# Patient Record
Sex: MALE | Employment: OTHER
[De-identification: names, ages, dates, MRNs, and addresses within clinical notes are randomized per-mention and may not be internally consistent; named-entity substitution may affect disease eponyms.]

---

## 2024-02-07 ENCOUNTER — HOSPITAL ENCOUNTER (OUTPATIENT)
Facility: HOSPITAL | Age: 89
Discharge: HOME | End: 2024-02-07
Attending: EMERGENCY MEDICINE
Payer: MEDICARE

## 2024-02-07 VITALS
TEMPERATURE: 97.5 F | RESPIRATION RATE: 16 BRPM | SYSTOLIC BLOOD PRESSURE: 130 MMHG | HEART RATE: 81 BPM | DIASTOLIC BLOOD PRESSURE: 73 MMHG

## 2024-02-07 DIAGNOSIS — L97.123: ICD-10-CM

## 2024-02-07 DIAGNOSIS — S80.12XS HEMATOMA OF LEFT LOWER EXTREMITY, SEQUELA: Primary | ICD-10-CM

## 2024-02-07 PROBLEM — S80.12XA HEMATOMA OF LEFT LOWER EXTREMITY: Status: ACTIVE | Noted: 2024-02-07

## 2024-02-07 PROCEDURE — 11043 DBRDMT MUSC&/FSCA 1ST 20/<: CPT

## 2024-02-07 PROCEDURE — 87077 CULTURE AEROBIC IDENTIFY: CPT

## 2024-02-07 PROCEDURE — 87070 CULTURE OTHR SPECIMN AEROBIC: CPT

## 2024-02-07 PROCEDURE — 87205 SMEAR GRAM STAIN: CPT

## 2024-02-07 PROCEDURE — 87186 SC STD MICRODIL/AGAR DIL: CPT

## 2024-02-07 PROCEDURE — 11046 DBRDMT MUSC&/FSCA EA ADDL: CPT

## 2024-02-07 RX ORDER — BACITRACIN ZINC AND POLYMYXIN B SULFATE 500; 1000 [USP'U]/G; [USP'U]/G
OINTMENT TOPICAL ONCE
Status: CANCELLED | OUTPATIENT
Start: 2024-02-07 | End: 2024-02-07

## 2024-02-07 RX ORDER — TRIAMCINOLONE ACETONIDE 1 MG/G
OINTMENT TOPICAL ONCE
Status: CANCELLED | OUTPATIENT
Start: 2024-02-07 | End: 2024-02-07

## 2024-02-07 RX ORDER — IBUPROFEN 200 MG
TABLET ORAL ONCE
OUTPATIENT
Start: 2024-02-07 | End: 2024-02-07

## 2024-02-07 RX ORDER — BETAMETHASONE DIPROPIONATE 0.5 MG/G
CREAM TOPICAL ONCE
Status: CANCELLED | OUTPATIENT
Start: 2024-02-07 | End: 2024-02-07

## 2024-02-07 RX ORDER — LIDOCAINE HYDROCHLORIDE 20 MG/ML
JELLY TOPICAL ONCE
Status: CANCELLED | OUTPATIENT
Start: 2024-02-07 | End: 2024-02-07

## 2024-02-07 RX ORDER — GINSENG 100 MG
CAPSULE ORAL ONCE
OUTPATIENT
Start: 2024-02-07 | End: 2024-02-07

## 2024-02-07 RX ORDER — CLOBETASOL PROPIONATE 0.5 MG/G
OINTMENT TOPICAL ONCE
OUTPATIENT
Start: 2024-02-07 | End: 2024-02-07

## 2024-02-07 RX ORDER — LIDOCAINE HYDROCHLORIDE 20 MG/ML
JELLY TOPICAL ONCE
OUTPATIENT
Start: 2024-02-07 | End: 2024-02-07

## 2024-02-07 RX ORDER — GENTAMICIN SULFATE 1 MG/G
OINTMENT TOPICAL ONCE
OUTPATIENT
Start: 2024-02-07 | End: 2024-02-07

## 2024-02-07 RX ORDER — GENTAMICIN SULFATE 1 MG/G
OINTMENT TOPICAL ONCE
Status: CANCELLED | OUTPATIENT
Start: 2024-02-07 | End: 2024-02-07

## 2024-02-07 RX ORDER — CLOBETASOL PROPIONATE 0.5 MG/G
OINTMENT TOPICAL ONCE
Status: CANCELLED | OUTPATIENT
Start: 2024-02-07 | End: 2024-02-07

## 2024-02-07 RX ORDER — LIDOCAINE HYDROCHLORIDE 40 MG/ML
SOLUTION TOPICAL ONCE
OUTPATIENT
Start: 2024-02-07 | End: 2024-02-07

## 2024-02-07 RX ORDER — LIDOCAINE 50 MG/G
OINTMENT TOPICAL ONCE
OUTPATIENT
Start: 2024-02-07 | End: 2024-02-07

## 2024-02-07 RX ORDER — SODIUM CHLOR/HYPOCHLOROUS ACID 0.033 %
SOLUTION, IRRIGATION IRRIGATION ONCE
OUTPATIENT
Start: 2024-02-07 | End: 2024-02-07

## 2024-02-07 RX ORDER — BACITRACIN ZINC AND POLYMYXIN B SULFATE 500; 1000 [USP'U]/G; [USP'U]/G
OINTMENT TOPICAL ONCE
OUTPATIENT
Start: 2024-02-07 | End: 2024-02-07

## 2024-02-07 RX ORDER — LIDOCAINE 50 MG/G
OINTMENT TOPICAL ONCE
Status: CANCELLED | OUTPATIENT
Start: 2024-02-07 | End: 2024-02-07

## 2024-02-07 RX ORDER — SODIUM CHLOR/HYPOCHLOROUS ACID 0.033 %
SOLUTION, IRRIGATION IRRIGATION ONCE
Status: CANCELLED | OUTPATIENT
Start: 2024-02-07 | End: 2024-02-07

## 2024-02-07 RX ORDER — LIDOCAINE 40 MG/G
CREAM TOPICAL ONCE
Status: CANCELLED | OUTPATIENT
Start: 2024-02-07 | End: 2024-02-07

## 2024-02-07 RX ORDER — IBUPROFEN 200 MG
TABLET ORAL ONCE
Status: CANCELLED | OUTPATIENT
Start: 2024-02-07 | End: 2024-02-07

## 2024-02-07 RX ORDER — LIDOCAINE 40 MG/G
CREAM TOPICAL ONCE
OUTPATIENT
Start: 2024-02-07 | End: 2024-02-07

## 2024-02-07 RX ORDER — LIDOCAINE HYDROCHLORIDE 40 MG/ML
SOLUTION TOPICAL ONCE
Status: CANCELLED | OUTPATIENT
Start: 2024-02-07 | End: 2024-02-07

## 2024-02-07 RX ORDER — GINSENG 100 MG
CAPSULE ORAL ONCE
Status: CANCELLED | OUTPATIENT
Start: 2024-02-07 | End: 2024-02-07

## 2024-02-07 RX ORDER — TRIAMCINOLONE ACETONIDE 1 MG/G
OINTMENT TOPICAL ONCE
OUTPATIENT
Start: 2024-02-07 | End: 2024-02-07

## 2024-02-07 RX ORDER — BETAMETHASONE DIPROPIONATE 0.5 MG/G
CREAM TOPICAL ONCE
OUTPATIENT
Start: 2024-02-07 | End: 2024-02-07

## 2024-02-07 NOTE — PLAN OF CARE
Home Health Referral for skilled Nursing wound care       Supply Company:     [facility]     Ordering Center:     [facility]  [address] [facility] [address] [phone]  WOUND CARE [phone]  FAX NUMBER [phone]    Patient Information:      Veronica Darling  [address]  [address] [facility] [address] [phone]   : 1934  AGE: 89 y.o.     GENDER: male   TODAYS DATE:  2024    Insurance:      PRIMARY INSURANCE:  [id number] - (Medicare)    Payer/Plan Subscr  Sex Relation Sub. Ins. ID Effective Group Num   1. MEDICARE - ME* VERONICA DARLING 1934 Male Self [id number]                                    PO BOX        Patient Wound Information:      Problem List Items Addressed This Visit          Other    Hematoma of left lower extremity - Primary    Ulcer of left thigh, with necrosis of muscle (HCC)    Relevant Orders    Initiate Outpatient Wound Care Protocol    Culture, Wound     Wound Care Documentation:  Wound 24 Thigh Left;Medial #1 (Active)   Wound Image   24 1408   Wound Etiology Traumatic 24 1408   Wound Cleansed Vashe 24 1408   Dressing/Treatment Alginate with Ag;Gauze dressing/dressing sponge;ABD;Roll gauze;Tape change 24 1428   Wound Length (cm) 6.6 cm 24 1323   Wound Width (cm) 4.5 cm 24 1323   Wound Depth (cm) 4 cm 24 1323   Wound Surface Area (cm^2) 29.7 cm^2 24 1323   Wound Volume (cm^3) 118.8 cm^3 24 1323   Post-Procedure Length (cm) 6.6 cm 24 1408   Post-Procedure Width (cm) 4.5 cm 24 1408   Post-Procedure Depth (cm) 5.3 cm 24 1408   Post-Procedure Surface Area (cm^2) 29.7 cm^2 24 1408   Post-Procedure Volume (cm^3) 157.41 cm^3 24 1408   Undermining Starts ___ O'Clock 2 24 1408   Undermining Ends___ O'Clock 4 24 1408   Undermining Maxium Distance (cm) 6 24 1408   Wound Assessment Eschar

## 2024-02-07 NOTE — PROGRESS NOTES
Patient Name:Rigo Butcher  : 1934  MRN: [id number]        Past Medical History:   Diagnosis Date    Atrial fibrillation (HCC)     Elevated PSA     Hypertension        Past Surgical History:   Procedure Laterality Date    TONSILLECTOMY         History reviewed. No pertinent family history.    Social History     Tobacco Use    Smoking status: Former     Current packs/day: 0.00     Types: Cigarettes     Quit date: 1964     Years since quittin.1    Smokeless tobacco: Never   Substance Use Topics    Alcohol use: Yes     Alcohol/week: 8.0 standard drinks of alcohol     Types: 7 Cans of beer, 1 Shots of liquor per week    Drug use: Never       No Known Allergies    Current Outpatient Medications on File Prior to Encounter   Medication Sig Dispense Refill    VITAMIN A PO Take by mouth      SELENIUM PO Take by mouth      ascorbic acid (VITAMIN C) 500 MG tablet Take 1 tablet by mouth      vitamin D 25 MCG (1000 UT) CAPS Take 1 tablet by mouth      Cyanocobalamin 1000 MCG LOZG Take 1 tablet by mouth      hydroCHLOROthiazide (HYDRODIURIL) 25 MG tablet TAKE 1 TABLET BY MOUTH ONCE EVERY DAY      metoprolol succinate (TOPROL XL) 50 MG extended release tablet metoprolol succinate ER 50 mg tablet,extended release 24 hr      ramipril (ALTACE) 10 MG capsule Take 1 capsule by mouth daily      rivaroxaban (XARELTO) 20 MG TABS tablet TAKE 1 TABLET BY MOUTH ONCE EVERY DAY      tamsulosin (FLOMAX) 0.4 MG capsule Take 1 capsule by mouth      triamcinolone (KENALOG) 0.025 % cream Place onto the skin 2 times daily      vitamin E 400 UNIT capsule Take 1 tablet by mouth      zinc gluconate 50 MG tablet Take 1 tablet by mouth       No current facility-administered medications on file prior to encounter.       No results found for: \"LABA1C\"    Wound 24 Thigh Left;Medial #1 (Active)   Wound Image   24 1408   Wound Etiology Traumatic 24 1408   Wound Cleansed Vashe 24 1408   Dressing/Treatment Alginate

## 2024-02-07 NOTE — FLOWSHEET NOTE
02/07/24 1323   Anesthetic   Anesthetic 4% Lidocaine Liquid Topical   Wound 02/07/24 Thigh Left;Medial #1   Date First Assessed/Time First Assessed: 02/07/24 1322   Present on Original Admission: Yes  Wound Approximate Age at First Assessment (Weeks): 8 weeks  Location: Thigh  Wound Location Orientation: Left;Medial  Wound Description (Comments): #1   Wound Image    Wound Etiology Other  (Hematoma)   Wound Length (cm) 6.6 cm   Wound Width (cm) 4.5 cm   Wound Depth (cm) 4 cm   Wound Surface Area (cm^2) 29.7 cm^2   Wound Volume (cm^3) 118.8 cm^3   Undermining Starts ___ O'Clock 12   Undermining Ends___ O'Clock 4   Undermining Maxium Distance (cm) 2.3   Wound Assessment Eschar dry;Subcutaneous;Slough;Pink/red;Granulation tissue  (blood clots)   Drainage Amount Moderate (25-50%)   Drainage Description Serosanguinous   Odor None   Ghazal-wound Assessment Blanchable erythema;Induration   Margins Flat/open edges   Wound Thickness Description not for Pressure Injury Full thickness     /73   Pulse 81   Temp 97.5 °F (36.4 °C) (Temporal)   Resp 16

## 2024-02-07 NOTE — FLOWSHEET NOTE
02/07/24 1428   Left Leg Edema Point of Measurement   Compression Therapy Tubular elastic support bandage   Wound 02/07/24 Thigh Left;Medial #1   Date First Assessed/Time First Assessed: 02/07/24 1322   Present on Original Admission: Yes  Wound Approximate Age at First Assessment (Weeks): 8 weeks  Location: Thigh  Wound Location Orientation: Left;Medial  Wound Description (Comments): #1   Dressing/Treatment Alginate with Ag;Gauze dressing/dressing sponge;ABD;Roll gauze;Tape change     Discharge Condition: Stable    Pain: 0    Ambulatory Status:Walking and Straight Cane    Discharge Destination: Home    Transportation:Car    Accompanied by: Self and Family    Discharge instructions reviewed with Self and Family and copy or written instructions have been provided. All questions/concerns have been addressed at this time.

## 2024-02-07 NOTE — PATIENT INSTRUCTIONS
Discharge Instructions for  [facility]  [address] [place] [address]  Telephone: [phone]   FAX [phone]    NAME:  Rigo Butcher  YOB: 1934  ACCOUNT NUMBER :  [id number]  DATE:  2/7/2024       : FLAVIO MACK RN     HOME HEALTH: [facility]     Wound Cleansing:   Do not scrub or use excessive force.  Cleanse wound prior to applying a clean dressing with:      [x] Cleanse wound with: VASHE irrigate wound     [x] May Shower at Discharge     [x] Shower on days of dressing change, remove dressing first and do Vashe cleanse after shower.     [] Keep Wound Dry in Shower (may purchase a cast cover if needed)       Dressings:           Wound Location Left thigh wound    Apply Primary Dressing:       [] MediHoney Gel    [] MediHoney Alginate  [] Alginate     [x] Alginate with Silver       [] Collagen   [] Collagen with Silver     [] Hydrocolloid  [] Hydrofera Blue Classic (moisten with saline)  [] Hydrofera Blue Ready  [] Other:    [] Pack wound loosely with      Cover and Secure with:    [x] Gauze   [] Vikram   [x] Kerlix  [] Ace Wrap     [] ABD  [x] Medipore tape  [] tape  [x] Other: Medial side of wound, fold 4x4 gauze and place on skin to assist with compressing area of wound with free space.    Avoid contact of tape with skin.    Change dressing:   [x] Daily for next three days then may switch to three times a week.    [] Every Other Day   [] Three times per week  [] Once a week   [] Do Not Change Dressing     [] Other:    Edema Control:   Apply every morning immediately when getting up should be applied to affected leg(s) from mid foot to knee making sure to cover the heel.  Remove every night before going to bed.    Apply:      [x] Tubigrip: Left Leg Double Layer to lower leg and upper thigh          [x] Elevate leg(s)  when sitting.      [x] Avoid prolonged standing in one place.    Return Appointment:    [x] Return Appointment: With

## 2024-02-08 ENCOUNTER — HOSPITAL ENCOUNTER (OUTPATIENT)
Facility: HOSPITAL | Age: 89
Discharge: HOME | End: 2024-02-08
Attending: EMERGENCY MEDICINE
Payer: MEDICARE

## 2024-02-08 VITALS — RESPIRATION RATE: 17 BRPM | TEMPERATURE: 98.1 F

## 2024-02-08 PROCEDURE — 99213 OFFICE O/P EST LOW 20 MIN: CPT

## 2024-02-08 NOTE — FLOWSHEET NOTE
02/08/24 1214   Left Leg Edema Point of Measurement   Compression Therapy Tubular elastic support bandage   Wound 02/07/24 Thigh Left;Medial #1   Date First Assessed/Time First Assessed: 02/07/24 1322   Present on Original Admission: Yes  Wound Approximate Age at First Assessment (Weeks): 8 weeks  Location: Thigh  Wound Location Orientation: Left;Medial  Wound Description (Comments): #1   Wound Cleansed Vashe  (irrigation)   Dressing/Treatment Alginate with Ag;Gauze dressing/dressing sponge;Roll gauze;Tape/Soft cloth adhesive tape  (drawtex)     Discharge Condition: Stable    Pain: 0    Ambulatory Status: None    Discharge Destination: home    Transportation:car    Accompanied by: FAMILY    Discharge instructions reviewed with SELF and copy or written instructions have been provided. All questions/concerns have been addressed at this time.

## 2024-02-10 LAB
BACTERIA SPEC CULT: ABNORMAL
GRAM STN SPEC: ABNORMAL
SERVICE CMNT-IMP: ABNORMAL

## 2024-02-21 ENCOUNTER — HOSPITAL ENCOUNTER (OUTPATIENT)
Facility: HOSPITAL | Age: 89
Discharge: HOME | End: 2024-02-21
Attending: EMERGENCY MEDICINE
Payer: MEDICARE

## 2024-02-21 VITALS
RESPIRATION RATE: 18 BRPM | HEART RATE: 93 BPM | SYSTOLIC BLOOD PRESSURE: 165 MMHG | TEMPERATURE: 97.8 F | DIASTOLIC BLOOD PRESSURE: 70 MMHG

## 2024-02-21 DIAGNOSIS — L97.123: Primary | ICD-10-CM

## 2024-02-21 PROCEDURE — 11043 DBRDMT MUSC&/FSCA 1ST 20/<: CPT

## 2024-02-21 RX ORDER — LIDOCAINE HYDROCHLORIDE 20 MG/ML
JELLY TOPICAL ONCE
OUTPATIENT
Start: 2024-02-21 | End: 2024-02-21

## 2024-02-21 RX ORDER — LIDOCAINE 40 MG/G
CREAM TOPICAL ONCE
OUTPATIENT
Start: 2024-02-21 | End: 2024-02-21

## 2024-02-21 RX ORDER — BACITRACIN ZINC AND POLYMYXIN B SULFATE 500; 1000 [USP'U]/G; [USP'U]/G
OINTMENT TOPICAL ONCE
OUTPATIENT
Start: 2024-02-21 | End: 2024-02-21

## 2024-02-21 RX ORDER — IBUPROFEN 200 MG
TABLET ORAL ONCE
OUTPATIENT
Start: 2024-02-21 | End: 2024-02-21

## 2024-02-21 RX ORDER — GENTAMICIN SULFATE 1 MG/G
OINTMENT TOPICAL ONCE
OUTPATIENT
Start: 2024-02-21 | End: 2024-02-21

## 2024-02-21 RX ORDER — LIDOCAINE 50 MG/G
OINTMENT TOPICAL ONCE
OUTPATIENT
Start: 2024-02-21 | End: 2024-02-21

## 2024-02-21 RX ORDER — CLOBETASOL PROPIONATE 0.5 MG/G
OINTMENT TOPICAL ONCE
OUTPATIENT
Start: 2024-02-21 | End: 2024-02-21

## 2024-02-21 RX ORDER — TRIAMCINOLONE ACETONIDE 1 MG/G
OINTMENT TOPICAL ONCE
OUTPATIENT
Start: 2024-02-21 | End: 2024-02-21

## 2024-02-21 RX ORDER — GINSENG 100 MG
CAPSULE ORAL ONCE
OUTPATIENT
Start: 2024-02-21 | End: 2024-02-21

## 2024-02-21 RX ORDER — SODIUM CHLOR/HYPOCHLOROUS ACID 0.033 %
SOLUTION, IRRIGATION IRRIGATION ONCE
OUTPATIENT
Start: 2024-02-21 | End: 2024-02-21

## 2024-02-21 RX ORDER — LIDOCAINE HYDROCHLORIDE 40 MG/ML
SOLUTION TOPICAL ONCE
OUTPATIENT
Start: 2024-02-21 | End: 2024-02-21

## 2024-02-21 RX ORDER — BETAMETHASONE DIPROPIONATE 0.5 MG/G
CREAM TOPICAL ONCE
OUTPATIENT
Start: 2024-02-21 | End: 2024-02-21

## 2024-02-21 NOTE — PATIENT INSTRUCTIONS
Discharge Instructions for  [facility]  [address] [place] [address]  Telephone: [phone]   FAX [phone]    NAME:  Rigo Butcher  YOB: 1934  ACCOUNT NUMBER :  [id number]  DATE:  2/21/2024       : FLAVIO MACK RN     HOME HEALTH: [facility]     Wound Cleansing:   Do not scrub or use excessive force.  Cleanse wound prior to applying a clean dressing with:      [x] Cleanse wound with: VASHE irrigate wound     [x] May Shower at Discharge     [x] Shower on days of dressing change, remove dressing first and do Vashe cleanse after shower.     [] Keep Wound Dry in Shower (may purchase a cast cover if needed)       Dressings:           Wound Location Left thigh wound    Apply Primary Dressing:       [] MediHoney Gel    [] MediHoney Alginate  [] Alginate     [x] Tanna to wound base then cover with Alginate with Silver       [] Collagen   [] Collagen with Silver     [] Hydrocolloid  [] Hydrofera Blue Classic (moisten with saline)  [] Hydrofera Blue Ready  [] Other:    [] Pack wound loosely with      Cover and Secure with:    [] Gauze   [] Vikram   [] Kerlix  [] Ace Wrap     [x] ABD  [] Medipore tape  [] tape  [] Other:    Avoid contact of tape with skin.    Change dressing:   [] Daily    [] Every Other Day   [x] Twice times per week  [] Once a week   [] Do Not Change Dressing     [] Other:    Edema Control:     Apply:      [x] Two layer calamine standard compression wrap, continue thigh high. (Area of knee cover with ABD pad posterior and medial do not tape)           [x] Elevate leg(s)  when sitting.      [x] Avoid prolonged standing in one place.    Return Appointment:    [x] Return Appointment: With Dr. Gio Guadalupe  in  2 Week(s)          Wound Care Center Information: Should you experience any significant changes in your wound(s) or have questions about your wound care, please contact the [facility] at

## 2024-02-21 NOTE — FLOWSHEET NOTE
02/21/24 1322   Anesthetic   Anesthetic 4% Lidocaine Liquid Topical   Wound 02/07/24 Thigh Left;Medial #1   Date First Assessed/Time First Assessed: 02/07/24 1322   Present on Original Admission: Yes  Wound Approximate Age at First Assessment (Weeks): 8 weeks  Location: Thigh  Wound Location Orientation: Left;Medial  Wound Description (Comments): #1   Wound Image    Wound Cleansed Cleansed with saline   Wound Length (cm) 4 cm   Wound Width (cm) 1.5 cm   Wound Depth (cm) 0.6 cm   Wound Surface Area (cm^2) 6 cm^2   Change in Wound Size % (l*w) 79.8   Wound Volume (cm^3) 3.6 cm^3   Wound Healing % 97   Undermining Starts ___ O'Clock 2   Undermining Ends___ O'Clock 3   Undermining Maxium Distance (cm) 1.5   Wound Assessment [place]/red;Slough   Drainage Amount Moderate (25-50%)   Drainage Description Serosanguinous   Odor None   Ghazal-wound Assessment Hyperpigmented   Margins Epibole (rolled edges)   Wound Thickness Description not for Pressure Injury Full thickness     BP (!) 165/70   Pulse 93   Temp 97.8 °F (36.6 °C) (Temporal)   Resp 18

## 2024-02-21 NOTE — PROGRESS NOTES
[facility]   Progress Note and Procedure Note   NO Procedure    Patient Name: Rigo Butcher  : 1934  MRN: [id number]    Past Medical History:   Diagnosis Date    Atrial fibrillation (HCC)     Elevated PSA     Hypertension      Past Surgical History:   Procedure Laterality Date    TONSILLECTOMY       No family history on file.  Social History     Tobacco Use    Smoking status: Former     Current packs/day: 0.00     Types: Cigarettes     Quit date: 1964     Years since quittin.1    Smokeless tobacco: Never   Substance Use Topics    Alcohol use: Yes     Alcohol/week: 8.0 standard drinks of alcohol     Types: 7 Cans of beer, 1 Shots of liquor per week    Drug use: Never     No Known Allergies  Current Outpatient Medications on File Prior to Encounter   Medication Sig Dispense Refill    VITAMIN A PO Take by mouth      SELENIUM PO Take by mouth      ascorbic acid (VITAMIN C) 500 MG tablet Take 1 tablet by mouth      vitamin D 25 MCG (1000 UT) CAPS Take 1 tablet by mouth      Cyanocobalamin 1000 MCG LOZG Take 1 tablet by mouth      hydroCHLOROthiazide (HYDRODIURIL) 25 MG tablet TAKE 1 TABLET BY MOUTH ONCE EVERY DAY      metoprolol succinate (TOPROL XL) 50 MG extended release tablet metoprolol succinate ER 50 mg tablet,extended release 24 hr      ramipril (ALTACE) 10 MG capsule Take 1 capsule by mouth daily      rivaroxaban (XARELTO) 20 MG TABS tablet TAKE 1 TABLET BY MOUTH ONCE EVERY DAY      tamsulosin (FLOMAX) 0.4 MG capsule Take 1 capsule by mouth      triamcinolone (KENALOG) 0.025 % cream Place onto the skin 2 times daily      vitamin E 400 UNIT capsule Take 1 tablet by mouth      zinc gluconate 50 MG tablet Take 1 tablet by mouth       No current facility-administered medications on file prior to encounter.     No results found for: \"LABA1C\"      Vitals:    24 1319   BP: (!) 165/70   Pulse: 93   Resp: 18   Temp: 97.8 °F (36.6 °C)      Wound 24 Thigh Left;Medial

## 2024-02-21 NOTE — FLOWSHEET NOTE
02/21/24 1351   Left Leg Edema Point of Measurement   Compression Therapy 2 layer compression wrap;Other  (thigh high)   Wound 02/07/24 Thigh Left;Medial #1   Date First Assessed/Time First Assessed: 02/07/24 1322   Present on Original Admission: Yes  Wound Approximate Age at First Assessment (Weeks): 8 weeks  Location: Thigh  Wound Location Orientation: Left;Medial  Wound Description (Comments): #1   Dressing Status New dressing applied   Dressing/Treatment Collagen with Ag;Alginate with Ag;ABD;Other (comment)  (cover posterior and medial knee w/ ABD)     Discharge Condition: Stable    Pain: 0    Ambulatory Status:Walking    Discharge Destination: Home    Transportation:Car    Accompanied by: Self and Family    Discharge instructions reviewed with Self and Family and copy or written instructions have been provided. All questions/concerns have been addressed at this time.     Multilayer Compression Wrap   (Not Unna) Below the Knee    NAME:  Rigo Butcher  YOB: 1934  MEDICAL RECORD NUMBER:  [id number]  DATE:  2/21/2024    Multilayer compression wrap: Removed old Multilayer wrap if indicated and wash leg with mild soap/water.  Applied moisturizing agent to dry skin as needed.   Applied primary and secondary dressing as ordered.  Applied multilayered dressing below the knee to left lower leg.  Instructed patient/caregiver not to remove dressing and to keep it clean and dry.   Instructed patient/caregiver on complications to report to provider, such as pain, numbness in toes, heavy drainage, and slippage of dressing.  Instructed patient on purpose of compression dressing and on activity and exercise recommendations.      Electronically signed by Ketty Armenta RN on 2/21/2024 at 1:53 PM

## 2024-03-06 ENCOUNTER — HOSPITAL ENCOUNTER (OUTPATIENT)
Facility: HOSPITAL | Age: 89
Discharge: HOME | End: 2024-03-06
Attending: EMERGENCY MEDICINE
Payer: MEDICARE

## 2024-03-06 VITALS
DIASTOLIC BLOOD PRESSURE: 85 MMHG | SYSTOLIC BLOOD PRESSURE: 134 MMHG | TEMPERATURE: 97.7 F | HEART RATE: 95 BPM | RESPIRATION RATE: 18 BRPM

## 2024-03-06 DIAGNOSIS — L97.123: Primary | ICD-10-CM

## 2024-03-06 PROCEDURE — 11042 DBRDMT SUBQ TIS 1ST 20SQCM/<: CPT

## 2024-03-06 RX ORDER — PREDNISONE 20 MG/1
1 TABLET ORAL DAILY
COMMUNITY

## 2024-03-06 RX ORDER — LIDOCAINE 50 MG/G
OINTMENT TOPICAL ONCE
OUTPATIENT
Start: 2024-03-06 | End: 2024-03-06

## 2024-03-06 RX ORDER — LIDOCAINE HYDROCHLORIDE 20 MG/ML
JELLY TOPICAL ONCE
OUTPATIENT
Start: 2024-03-06 | End: 2024-03-06

## 2024-03-06 RX ORDER — LIDOCAINE 40 MG/G
CREAM TOPICAL ONCE
OUTPATIENT
Start: 2024-03-06 | End: 2024-03-06

## 2024-03-06 RX ORDER — CLOBETASOL PROPIONATE 0.5 MG/G
OINTMENT TOPICAL ONCE
OUTPATIENT
Start: 2024-03-06 | End: 2024-03-06

## 2024-03-06 RX ORDER — SODIUM CHLOR/HYPOCHLOROUS ACID 0.033 %
SOLUTION, IRRIGATION IRRIGATION ONCE
OUTPATIENT
Start: 2024-03-06 | End: 2024-03-06

## 2024-03-06 RX ORDER — BETAMETHASONE DIPROPIONATE 0.5 MG/G
CREAM TOPICAL ONCE
OUTPATIENT
Start: 2024-03-06 | End: 2024-03-06

## 2024-03-06 RX ORDER — TRIAMCINOLONE ACETONIDE 1 MG/G
OINTMENT TOPICAL ONCE
OUTPATIENT
Start: 2024-03-06 | End: 2024-03-06

## 2024-03-06 RX ORDER — GENTAMICIN SULFATE 1 MG/G
OINTMENT TOPICAL ONCE
OUTPATIENT
Start: 2024-03-06 | End: 2024-03-06

## 2024-03-06 RX ORDER — LIDOCAINE HYDROCHLORIDE 40 MG/ML
SOLUTION TOPICAL ONCE
OUTPATIENT
Start: 2024-03-06 | End: 2024-03-06

## 2024-03-06 RX ORDER — IBUPROFEN 200 MG
TABLET ORAL ONCE
OUTPATIENT
Start: 2024-03-06 | End: 2024-03-06

## 2024-03-06 RX ORDER — GINSENG 100 MG
CAPSULE ORAL ONCE
OUTPATIENT
Start: 2024-03-06 | End: 2024-03-06

## 2024-03-06 RX ORDER — BACITRACIN ZINC AND POLYMYXIN B SULFATE 500; 1000 [USP'U]/G; [USP'U]/G
OINTMENT TOPICAL ONCE
OUTPATIENT
Start: 2024-03-06 | End: 2024-03-06

## 2024-03-06 NOTE — PATIENT INSTRUCTIONS
Discharge Instructions for  [facility]  [address] [place] [address]  Telephone: [phone]   FAX [phone]    NAME:  Rigo Butcher  YOB: 1934  ACCOUNT NUMBER :  [id number]  DATE:  3/6/2024       : FLAVIO MACK RN     HOME HEALTH: [facility] may discharge after one week from service.     Wound Cleansing:   Do not scrub or use excessive force.  Cleanse wound prior to applying a clean dressing with:      [x] Cleanse wound with: VASHE irrigate wound     [x] May Shower at Discharge     [x] Shower on days of dressing change, remove dressing first and do Vashe cleanse after shower.     [] Keep Wound Dry in Shower (may purchase a cast cover if needed)       Dressings:           Wound Location Left thigh wound    Apply Primary Dressing:       [] MediHoney Gel    [] MediHoney Alginate  [] Alginate     [x] Alginate with Silver       [] Collagen   [] Collagen with Silver     [] Hydrocolloid  [] Hydrofera Blue Classic (moisten with saline)  [] Hydrofera Blue Ready  [] Other:    [] Pack wound loosely with      Cover and Secure with:    [] Gauze   [] Vikram   [] Kerlix  [] Ace Wrap     [] ABD  [] Medipore tape  [] tape  [x] Other: Foam with silicone boarder    Avoid contact of tape with skin.    Change dressing:   [] Daily    [x] Every Other Day   [] Twice times per week  [] Once a week   [] Do Not Change Dressing     [x] Other: Single tubi size F to knee, Knee to groin size G single tubi. (May start using own compression once purchased)             [x] Elevate leg(s)  when sitting.      [x] Avoid prolonged standing in one place.    Return Appointment:    [x] Return Appointment: With Dr. Gio Guadalupe  in  3 Week(s)          Wound Care Center Information: Should you experience any significant changes in your wound(s) or have questions about your wound care, please contact the [facility] at MONDAY-THURSDAY 8:00 am - 4:30 AND

## 2024-03-06 NOTE — PROGRESS NOTES
[facility]   Progress Note and Procedure Note   NO Procedure    Patient Name: Rigo Butcher  : 1934  MRN: [id number]    Past Medical History:   Diagnosis Date    Atrial fibrillation (HCC)     Elevated PSA     Hypertension      Past Surgical History:   Procedure Laterality Date    TONSILLECTOMY       No family history on file.  Social History     Tobacco Use    Smoking status: Former     Current packs/day: 0.00     Types: Cigarettes     Quit date: 1964     Years since quittin.2    Smokeless tobacco: Never   Substance Use Topics    Alcohol use: Yes     Alcohol/week: 8.0 standard drinks of alcohol     Types: 7 Cans of beer, 1 Shots of liquor per week    Drug use: Never     No Known Allergies  Current Outpatient Medications on File Prior to Encounter   Medication Sig Dispense Refill    predniSONE (DELTASONE) 20 MG tablet Take 1 tablet by mouth daily      VITAMIN A PO Take by mouth      SELENIUM PO Take by mouth      ascorbic acid (VITAMIN C) 500 MG tablet Take 1 tablet by mouth      vitamin D 25 MCG (1000 UT) CAPS Take 1 tablet by mouth      Cyanocobalamin 1000 MCG LOZG Take 1 tablet by mouth      hydroCHLOROthiazide (HYDRODIURIL) 25 MG tablet TAKE 1 TABLET BY MOUTH ONCE EVERY DAY      metoprolol succinate (TOPROL XL) 50 MG extended release tablet metoprolol succinate ER 50 mg tablet,extended release 24 hr      ramipril (ALTACE) 10 MG capsule Take 1 capsule by mouth daily      rivaroxaban (XARELTO) 20 MG TABS tablet TAKE 1 TABLET BY MOUTH ONCE EVERY DAY (Patient not taking: Reported on 3/6/2024)      tamsulosin (FLOMAX) 0.4 MG capsule Take 1 capsule by mouth      triamcinolone (KENALOG) 0.025 % cream Place onto the skin 2 times daily      vitamin E 400 UNIT capsule Take 1 tablet by mouth      zinc gluconate 50 MG tablet Take 1 tablet by mouth       No current facility-administered medications on file prior to encounter.     No results found for: \"LABA1C\"      Vitals:

## 2024-03-06 NOTE — FLOWSHEET NOTE
03/06/24 1318   Anesthetic   Anesthetic 4% Lidocaine Liquid Topical   Left Leg Edema Point of Measurement   Leg circumference 33.2 cm   Ankle circumference 23 cm   LLE Neurovascular Assessment   Capillary Refill Less than/Equal to 3 seconds   Color Appropriate for Ethnicity   Temperature Cold   L Pedal Pulse +2   Wound 02/07/24 Thigh Left;Medial #1   Date First Assessed/Time First Assessed: 02/07/24 1322   Present on Original Admission: Yes  Wound Approximate Age at First Assessment (Weeks): 8 weeks  Location: Thigh  Wound Location Orientation: Left;Medial  Wound Description (Comments): #1   Wound Image    Dressing Status Old drainage noted   Wound Cleansed Cleansed with saline   Wound Length (cm) 3.5 cm   Wound Width (cm) 1.3 cm   Wound Depth (cm) 0.4 cm   Wound Surface Area (cm^2) 4.55 cm^2   Change in Wound Size % (l*w) 84.68   Wound Volume (cm^3) 1.82 cm^3   Wound Healing % 98   Wound Assessment [place]/red;Slough   Drainage Amount Moderate (25-50%)   Drainage Description Serosanguinous   Odor None   Ghazal-wound Assessment Hyperpigmented;Blanchable erythema   Margins Epibole (rolled edges)     /85   Pulse 95   Temp 97.7 °F (36.5 °C) (Temporal)   Resp 18

## 2024-03-06 NOTE — FLOWSHEET NOTE
03/06/24 1353   Left Leg Edema Point of Measurement   Compression Therapy Tubular elastic support bandage;Other  (single F up to the knee; single G from knee to groin)   Tubular Elastic Support Bandage Compression Pressure Low   Wound 02/07/24 Thigh Left;Medial #1   Date First Assessed/Time First Assessed: 02/07/24 1322   Present on Original Admission: Yes  Wound Approximate Age at First Assessment (Weeks): 8 weeks  Location: Thigh  Wound Location Orientation: Left;Medial  Wound Description (Comments): #1   Dressing Status New dressing applied   Wound Cleansed Vashe   Dressing/Treatment Alginate with Ag;Silicone border     Discharge Condition: Stable    Pain: 0    Ambulatory Status:Walking    Discharge Destination: Home    Transportation:Car    Accompanied by: Self and Family    Discharge instructions reviewed with Self and Family and copy or written instructions have been provided. All questions/concerns have been addressed at this time.

## 2024-03-27 ENCOUNTER — HOSPITAL ENCOUNTER (OUTPATIENT)
Facility: HOSPITAL | Age: 89
Discharge: HOME | End: 2024-03-27
Attending: EMERGENCY MEDICINE
Payer: MEDICARE

## 2024-03-27 VITALS
DIASTOLIC BLOOD PRESSURE: 70 MMHG | TEMPERATURE: 97.6 F | HEART RATE: 94 BPM | SYSTOLIC BLOOD PRESSURE: 135 MMHG | RESPIRATION RATE: 16 BRPM

## 2024-03-27 DIAGNOSIS — L97.123: Primary | ICD-10-CM

## 2024-03-27 PROCEDURE — 11042 DBRDMT SUBQ TIS 1ST 20SQCM/<: CPT

## 2024-03-27 RX ORDER — TRIAMCINOLONE ACETONIDE 1 MG/G
OINTMENT TOPICAL ONCE
OUTPATIENT
Start: 2024-03-27 | End: 2024-03-27

## 2024-03-27 RX ORDER — GINSENG 100 MG
CAPSULE ORAL ONCE
OUTPATIENT
Start: 2024-03-27 | End: 2024-03-27

## 2024-03-27 RX ORDER — BETAMETHASONE DIPROPIONATE 0.5 MG/G
CREAM TOPICAL ONCE
OUTPATIENT
Start: 2024-03-27 | End: 2024-03-27

## 2024-03-27 RX ORDER — GENTAMICIN SULFATE 1 MG/G
OINTMENT TOPICAL ONCE
OUTPATIENT
Start: 2024-03-27 | End: 2024-03-27

## 2024-03-27 RX ORDER — SODIUM CHLOR/HYPOCHLOROUS ACID 0.033 %
SOLUTION, IRRIGATION IRRIGATION ONCE
OUTPATIENT
Start: 2024-03-27 | End: 2024-03-27

## 2024-03-27 RX ORDER — LIDOCAINE HYDROCHLORIDE 40 MG/ML
SOLUTION TOPICAL ONCE
OUTPATIENT
Start: 2024-03-27 | End: 2024-03-27

## 2024-03-27 RX ORDER — LIDOCAINE HYDROCHLORIDE 20 MG/ML
JELLY TOPICAL ONCE
OUTPATIENT
Start: 2024-03-27 | End: 2024-03-27

## 2024-03-27 RX ORDER — LIDOCAINE 40 MG/G
CREAM TOPICAL ONCE
OUTPATIENT
Start: 2024-03-27 | End: 2024-03-27

## 2024-03-27 RX ORDER — LIDOCAINE 50 MG/G
OINTMENT TOPICAL ONCE
OUTPATIENT
Start: 2024-03-27 | End: 2024-03-27

## 2024-03-27 RX ORDER — BACITRACIN ZINC AND POLYMYXIN B SULFATE 500; 1000 [USP'U]/G; [USP'U]/G
OINTMENT TOPICAL ONCE
OUTPATIENT
Start: 2024-03-27 | End: 2024-03-27

## 2024-03-27 RX ORDER — IBUPROFEN 200 MG
TABLET ORAL ONCE
OUTPATIENT
Start: 2024-03-27 | End: 2024-03-27

## 2024-03-27 RX ORDER — CLOBETASOL PROPIONATE 0.5 MG/G
OINTMENT TOPICAL ONCE
OUTPATIENT
Start: 2024-03-27 | End: 2024-03-27

## 2024-03-27 NOTE — PROGRESS NOTES
[facility]   Progress Note and Procedure Note   NO Procedure    Patient Name: Rigo Butcher  : 1934  MRN: [id number]    Past Medical History:   Diagnosis Date    Atrial fibrillation (HCC)     Elevated PSA     Hypertension      Past Surgical History:   Procedure Laterality Date    TONSILLECTOMY       No family history on file.  Social History     Tobacco Use    Smoking status: Former     Current packs/day: 0.00     Types: Cigarettes     Quit date: 1964     Years since quittin.2    Smokeless tobacco: Never   Substance Use Topics    Alcohol use: Yes     Alcohol/week: 8.0 standard drinks of alcohol     Types: 7 Cans of beer, 1 Shots of liquor per week    Drug use: Never     No Known Allergies  Current Outpatient Medications on File Prior to Encounter   Medication Sig Dispense Refill    predniSONE (DELTASONE) 20 MG tablet Take 1 tablet by mouth daily (Patient not taking: Reported on 3/27/2024)      VITAMIN A PO Take by mouth      SELENIUM PO Take by mouth      ascorbic acid (VITAMIN C) 500 MG tablet Take 1 tablet by mouth      vitamin D 25 MCG (1000 UT) CAPS Take 1 tablet by mouth      Cyanocobalamin 1000 MCG LOZG Take 1 tablet by mouth      hydroCHLOROthiazide (HYDRODIURIL) 25 MG tablet TAKE 1 TABLET BY MOUTH ONCE EVERY DAY      metoprolol succinate (TOPROL XL) 50 MG extended release tablet metoprolol succinate ER 50 mg tablet,extended release 24 hr      ramipril (ALTACE) 10 MG capsule Take 1 capsule by mouth daily      rivaroxaban (XARELTO) 20 MG TABS tablet 1 tablet      tamsulosin (FLOMAX) 0.4 MG capsule Take 1 capsule by mouth      triamcinolone (KENALOG) 0.025 % cream Place onto the skin 2 times daily      vitamin E 400 UNIT capsule Take 1 tablet by mouth      zinc gluconate 50 MG tablet Take 1 tablet by mouth       No current facility-administered medications on file prior to encounter.     No results found for: \"LABA1C\"      Vitals:    24 1338   BP: 135/70

## 2024-03-27 NOTE — PATIENT INSTRUCTIONS
Discharge Instructions for  [facility]  [address] [place] [address]  Telephone: [phone]   FAX [phone]    NAME:  Rigo Butcher  YOB: 1934  ACCOUNT NUMBER :  [id number]  DATE:  3/27/2024       : FLAVIO MACK RN     HOME HEALTH: [facility] may discharge after one week from service.     Wound Cleansing:   Do not scrub or use excessive force.  Cleanse wound prior to applying a clean dressing with:      [x] Cleanse wound with: VASHE irrigate wound     [x] May Shower at Discharge     [x] Shower on days of dressing change, remove dressing first and do Vashe cleanse after shower.     [] Keep Wound Dry in Shower (may purchase a cast cover if needed)       Dressings:           Wound Location Left thigh wound    Apply Primary Dressing:       [] MediHoney Gel    [] MediHoney Alginate  [] Alginate     [x] Xeroform gauze       [] Collagen   [] Collagen with Silver     [] Hydrocolloid  [] Hydrofera Blue Classic (moisten with saline)  [] Hydrofera Blue Ready  [] Other:    [] Pack wound loosely with      Cover and Secure with:    [] Gauze   [] Vikram   [] Kerlix  [] Ace Wrap     [] ABD  [] Medipore tape  [] tape  [x] Other: Foam with silicone boarder    Avoid contact of tape with skin.    Change dressing:   [] Daily    [x] Every two-three days   [] Three times per week  [] Once a week   [] Do Not Change Dressing     [] Other: Patient own compression              [x] Elevate leg(s)  when sitting.      [x] Avoid prolonged standing in one place.    Return Appointment:    [x] Return Appointment: With Dr. Gio Guadalupe  in  3 Week(s)          Wound Care Center Information: Should you experience any significant changes in your wound(s) or have questions about your wound care, please contact the [facility] at MONDAY-THURSDAY 8:00 am - 4:30 AND Friday 8:00 AM- 12:00 PM .  If you need help with your wound outside these hours and

## 2024-03-27 NOTE — FLOWSHEET NOTE
03/27/24 1427   Wound 02/07/24 Thigh Left;Medial #1   Date First Assessed/Time First Assessed: 02/07/24 1322   Present on Original Admission: Yes  Wound Approximate Age at First Assessment (Weeks): 8 weeks  Location: Thigh  Wound Location Orientation: Left;Medial  Wound Description (Comments): #1   Wound Cleansed Vashe   Dressing/Treatment Xeroform;Silicone border     /70   Pulse 94   Temp 97.6 °F (36.4 °C) (Temporal)   Resp 16      Discharge Condition: Stable    Pain: 0    Ambulatory Status:Walking    Discharge Destination: Home    Transportation:Car    Accompanied by: Self and Family    Discharge instructions reviewed with Self and Family and copy or written instructions have been provided. All questions/concerns have been addressed at this time.

## 2024-03-27 NOTE — FLOWSHEET NOTE
03/27/24 1341   Anesthetic   Anesthetic 4% Lidocaine Liquid Topical   Left Leg Edema Point of Measurement   Leg circumference 32.7 cm   Ankle circumference 24.4 cm   LLE Neurovascular Assessment   Capillary Refill Greater than 3 seconds   Color Appropriate for Ethnicity   Temperature Warm   L Pedal Pulse +1   Wound 02/07/24 Thigh Left;Medial #1   Date First Assessed/Time First Assessed: 02/07/24 1322   Present on Original Admission: Yes  Wound Approximate Age at First Assessment (Weeks): 8 weeks  Location: Thigh  Wound Location Orientation: Left;Medial  Wound Description (Comments): #1   Wound Image    Wound Cleansed Cleansed with saline   Wound Length (cm) 1 cm   Wound Width (cm) 0.4 cm   Wound Depth (cm) 0.2 cm   Wound Surface Area (cm^2) 0.4 cm^2   Change in Wound Size % (l*w) 98.65   Wound Volume (cm^3) 0.08 cm^3   Wound Healing % 100   Wound Assessment Pink/red;Slough;Epithelialization   Drainage Amount Moderate (25-50%)   Drainage Description Serosanguinous;Green   Odor None   Ghazal-wound Assessment Hyperpigmented;Maceration;Blanchable erythema   Margins Epibole (rolled edges)   Wound Thickness Description not for Pressure Injury Full thickness     /70   Pulse 94   Temp 97.6 °F (36.4 °C) (Temporal)   Resp 16

## 2024-04-17 ENCOUNTER — HOSPITAL ENCOUNTER (OUTPATIENT)
Facility: HOSPITAL | Age: 89
Discharge: HOME | End: 2024-04-17
Attending: EMERGENCY MEDICINE
Payer: MEDICARE

## 2024-04-17 VITALS
SYSTOLIC BLOOD PRESSURE: 127 MMHG | HEART RATE: 90 BPM | RESPIRATION RATE: 16 BRPM | TEMPERATURE: 98.3 F | DIASTOLIC BLOOD PRESSURE: 64 MMHG

## 2024-04-17 DIAGNOSIS — L97.123: Primary | ICD-10-CM

## 2024-04-17 PROCEDURE — 99211 OFF/OP EST MAY X REQ PHY/QHP: CPT

## 2024-04-17 RX ORDER — BACITRACIN ZINC AND POLYMYXIN B SULFATE 500; 1000 [USP'U]/G; [USP'U]/G
OINTMENT TOPICAL ONCE
Status: CANCELLED | OUTPATIENT
Start: 2024-04-17 | End: 2024-04-17

## 2024-04-17 RX ORDER — LIDOCAINE 40 MG/G
CREAM TOPICAL ONCE
Status: CANCELLED | OUTPATIENT
Start: 2024-04-17 | End: 2024-04-17

## 2024-04-17 RX ORDER — LIDOCAINE 50 MG/G
OINTMENT TOPICAL ONCE
Status: CANCELLED | OUTPATIENT
Start: 2024-04-17 | End: 2024-04-17

## 2024-04-17 RX ORDER — GENTAMICIN SULFATE 1 MG/G
OINTMENT TOPICAL ONCE
Status: CANCELLED | OUTPATIENT
Start: 2024-04-17 | End: 2024-04-17

## 2024-04-17 RX ORDER — TRIAMCINOLONE ACETONIDE 1 MG/G
OINTMENT TOPICAL ONCE
Status: CANCELLED | OUTPATIENT
Start: 2024-04-17 | End: 2024-04-17

## 2024-04-17 RX ORDER — SODIUM CHLOR/HYPOCHLOROUS ACID 0.033 %
SOLUTION, IRRIGATION IRRIGATION ONCE
Status: CANCELLED | OUTPATIENT
Start: 2024-04-17 | End: 2024-04-17

## 2024-04-17 RX ORDER — IBUPROFEN 200 MG
TABLET ORAL ONCE
Status: CANCELLED | OUTPATIENT
Start: 2024-04-17 | End: 2024-04-17

## 2024-04-17 RX ORDER — CLOBETASOL PROPIONATE 0.5 MG/G
OINTMENT TOPICAL ONCE
Status: CANCELLED | OUTPATIENT
Start: 2024-04-17 | End: 2024-04-17

## 2024-04-17 RX ORDER — GINSENG 100 MG
CAPSULE ORAL ONCE
Status: CANCELLED | OUTPATIENT
Start: 2024-04-17 | End: 2024-04-17

## 2024-04-17 RX ORDER — BETAMETHASONE DIPROPIONATE 0.5 MG/G
CREAM TOPICAL ONCE
Status: CANCELLED | OUTPATIENT
Start: 2024-04-17 | End: 2024-04-17

## 2024-04-17 RX ORDER — LIDOCAINE HYDROCHLORIDE 20 MG/ML
JELLY TOPICAL ONCE
Status: CANCELLED | OUTPATIENT
Start: 2024-04-17 | End: 2024-04-17

## 2024-04-17 RX ORDER — LIDOCAINE HYDROCHLORIDE 40 MG/ML
SOLUTION TOPICAL ONCE
Status: CANCELLED | OUTPATIENT
Start: 2024-04-17 | End: 2024-04-17

## 2024-04-17 NOTE — PROGRESS NOTES
[facility]   Progress Note and Procedure Note   NO Procedure    Patient Name: Rigo Butcher  : 1934  MRN: [id number]    Past Medical History:   Diagnosis Date    Atrial fibrillation (HCC)     Elevated PSA     Hypertension      Past Surgical History:   Procedure Laterality Date    TONSILLECTOMY       History reviewed. No pertinent family history.  Social History     Tobacco Use    Smoking status: Former     Current packs/day: 0.00     Types: Cigarettes     Quit date: 1964     Years since quittin.3    Smokeless tobacco: Never   Substance Use Topics    Alcohol use: Yes     Alcohol/week: 8.0 standard drinks of alcohol     Types: 7 Cans of beer, 1 Shots of liquor per week    Drug use: Never     No Known Allergies  Current Outpatient Medications on File Prior to Encounter   Medication Sig Dispense Refill    predniSONE (DELTASONE) 20 MG tablet Take 1 tablet by mouth daily (Patient not taking: Reported on 3/27/2024)      VITAMIN A PO Take by mouth      SELENIUM PO Take by mouth      ascorbic acid (VITAMIN C) 500 MG tablet Take 1 tablet by mouth      vitamin D 25 MCG (1000 UT) CAPS Take 1 tablet by mouth      Cyanocobalamin 1000 MCG LOZG Take 1 tablet by mouth      hydroCHLOROthiazide (HYDRODIURIL) 25 MG tablet TAKE 1 TABLET BY MOUTH ONCE EVERY DAY      metoprolol succinate (TOPROL XL) 50 MG extended release tablet metoprolol succinate ER 50 mg tablet,extended release 24 hr      ramipril (ALTACE) 10 MG capsule Take 1 capsule by mouth daily      rivaroxaban (XARELTO) 20 MG TABS tablet 1 tablet      tamsulosin (FLOMAX) 0.4 MG capsule Take 1 capsule by mouth      triamcinolone (KENALOG) 0.025 % cream Place onto the skin 2 times daily      vitamin E 400 UNIT capsule Take 1 tablet by mouth      zinc gluconate 50 MG tablet Take 1 tablet by mouth       No current facility-administered medications on file prior to encounter.     No results found for: \"LABA1C\"      Vitals:    24

## 2024-04-17 NOTE — FLOWSHEET NOTE
04/17/24 1329   Wound 02/07/24 Thigh Left;Medial #1   Date First Assessed/Time First Assessed: 02/07/24 1322   Present on Original Admission: Yes  Wound Approximate Age at First Assessment (Weeks): 8 weeks  Location: Thigh  Wound Location Orientation: Left;Medial  Wound Description (Comments): #1   Wound Image    Wound Cleansed Cleansed with saline   Wound Length (cm) 0 cm   Wound Width (cm) 0 cm   Wound Depth (cm) 0 cm   Wound Surface Area (cm^2) 0 cm^2   Change in Wound Size % (l*w) 100   Wound Volume (cm^3) 0 cm^3   Wound Healing % 100   Wound Assessment Pink/red   Drainage Amount None (dry)   Odor None   Ghazal-wound Assessment Intact   Margins Attached edges   Pain Assessment   Pain Assessment None - Denies Pain     /64   Pulse 90   Temp 98.3 °F (36.8 °C) (Temporal)   Resp 16

## 2024-04-17 NOTE — PATIENT INSTRUCTIONS
[facility] THANK YOU      Your treatment has been completed at [facility] outpatient wound clinic on 4/17/2024  , per Dr. Guadalupe.     We know patients have several options when choosing a health care provider. We would like to express our sincere appreciation for having had the chance to be yours. More importantly, we enjoy having you as part of our family.     We also hope your experience with us has surpassed your expectations and that you have been pleased with our service. We appreciate the confidence you've shown in selecting us as your health care provider and we will continue or commitment to provide the highest quality of service.      Again, thank you for choosing us for your health care needs. If you have any further questions or concerns, please call [phone]. It has been our pleasure serving you and we hope to continue our relationship in the future, if the need occurs.       Sincerely,    [facility] Team